# Patient Record
Sex: MALE
[De-identification: names, ages, dates, MRNs, and addresses within clinical notes are randomized per-mention and may not be internally consistent; named-entity substitution may affect disease eponyms.]

---

## 2020-07-21 PROBLEM — Z00.00 ENCOUNTER FOR PREVENTIVE HEALTH EXAMINATION: Status: ACTIVE | Noted: 2020-07-21

## 2020-07-23 ENCOUNTER — APPOINTMENT (OUTPATIENT)
Dept: OTOLARYNGOLOGY | Facility: CLINIC | Age: 80
End: 2020-07-23
Payer: MEDICARE

## 2020-07-23 VITALS
DIASTOLIC BLOOD PRESSURE: 74 MMHG | WEIGHT: 150 LBS | OXYGEN SATURATION: 98 % | HEART RATE: 49 BPM | SYSTOLIC BLOOD PRESSURE: 147 MMHG | BODY MASS INDEX: 29.45 KG/M2 | TEMPERATURE: 98.6 F | HEIGHT: 60 IN

## 2020-07-23 DIAGNOSIS — Z78.9 OTHER SPECIFIED HEALTH STATUS: ICD-10-CM

## 2020-07-23 DIAGNOSIS — Z87.09 PERSONAL HISTORY OF OTHER DISEASES OF THE RESPIRATORY SYSTEM: ICD-10-CM

## 2020-07-23 PROCEDURE — 69210 REMOVE IMPACTED EAR WAX UNI: CPT

## 2020-07-23 PROCEDURE — 92550 TYMPANOMETRY & REFLEX THRESH: CPT

## 2020-07-23 PROCEDURE — 99204 OFFICE O/P NEW MOD 45 MIN: CPT | Mod: 25

## 2020-07-23 PROCEDURE — 31575 DIAGNOSTIC LARYNGOSCOPY: CPT

## 2020-07-23 PROCEDURE — 92557 COMPREHENSIVE HEARING TEST: CPT

## 2020-07-23 RX ORDER — OMEPRAZOLE 40 MG/1
40 CAPSULE, DELAYED RELEASE ORAL
Qty: 90 | Refills: 1 | Status: ACTIVE | COMMUNITY
Start: 2020-07-23 | End: 1900-01-01

## 2020-07-23 NOTE — REVIEW OF SYSTEMS
[Patient Intake Form Reviewed] : Patient intake form was reviewed [As Noted in HPI] : as noted in HPI [Negative] : Neurological

## 2020-07-24 NOTE — HISTORY OF PRESENT ILLNESS
[de-identified] : 78 yo man with b progressive hl for years, uses hearing aids. He feels his hearing has decreased over the past week or two. In addition, he has had years of mucus in his mouth at night. After he lays down he feels it in his nose and throat and it makes him cough. it is a severe symptom;. Has tried atrovent and flonase from another doctor but it has not worked. no relevant fh nonsmoker.

## 2020-07-24 NOTE — REASON FOR VISIT
[Initial Evaluation] : an initial evaluation for [FreeTextEntry2] : hearing loss, mucus in throat making him cough at night

## 2020-07-24 NOTE — PROCEDURE
[Unable to Cooperate with Mirror] : patient unable to cooperate with mirror [Flexible Endoscope] : examined with the flexible endoscope [Complicated Symptoms] : complicated symptoms requiring more thorough examination than provided by mirror [Normal] : the false vocal folds were pink and regular, the ventricular sulcus was open, the true vocal folds were glistening white, tense and of equal length, mobility, and height [Interarytenoid Edema] : interarytenoid area edematous [Cerumen Impaction] : Cerumen Impaction [Same] : same as the Pre Op Dx. [] : Removal of Cerumen [Serial Number: ___] : Serial Number: [unfilled] [de-identified] : IAH cw rl\par done for cough fullness in throat and mucus unresponsive to allergy and atrophic rhinits therapy by other mds [FreeTextEntry6] : b copiou cerumen impaction removed atraumatically with suction, l under microscope

## 2020-07-24 NOTE — DATA REVIEWED
[de-identified] : b downsloping [facility] he says similar to his prior tests he did not bring. He did not bring his HA either.

## 2020-07-24 NOTE — DATA REVIEWED
[de-identified] : [facility] he says similar to his prior tests he did not bring. He did not bring his HA either.

## 2020-07-24 NOTE — PHYSICAL EXAM
[Midline] : trachea located in midline position [Laryngoscopy Performed] : laryngoscopy was performed, see procedure section for findings [Normal] : no rashes [de-identified] : b copious cerumen impaction removed atrauamticallly with sution

## 2020-07-24 NOTE — HISTORY OF PRESENT ILLNESS
[de-identified] : 80 yo man with b progressive hl for years, uses hearing aids. He feels his hearing has decreased over the past week or two. In addition, he has had years of mucus in his mouth at night. After he lays down he feels it in his nose and throat and it makes him cough. it is a severe symptom;. Has tried atrovent and flonase from another doctor but it has not worked. no relevant fh nonsmoker.

## 2020-07-24 NOTE — PROCEDURE
[Unable to Cooperate with Mirror] : patient unable to cooperate with mirror [Flexible Endoscope] : examined with the flexible endoscope [Complicated Symptoms] : complicated symptoms requiring more thorough examination than provided by mirror [Interarytenoid Edema] : interarytenoid area edematous [Normal] : the false vocal folds were pink and regular, the ventricular sulcus was open, the true vocal folds were glistening white, tense and of equal length, mobility, and height [Cerumen Impaction] : Cerumen Impaction [] : Removal of Cerumen [Same] : same as the Pre Op Dx. [Serial Number: ___] : Serial Number: [unfilled] [de-identified] : IAH cw rl\par done for cough fullness in throat and mucus unresponsive to allergy and atrophic rhinits therapy by other mds [FreeTextEntry6] : b copiou cerumen impaction removed atraumatically with suction, l under microscope

## 2020-07-24 NOTE — PHYSICAL EXAM
[Midline] : trachea located in midline position [Laryngoscopy Performed] : laryngoscopy was performed, see procedure section for findings [Normal] : no rashes [de-identified] : b copious cerumen impaction removed atrauamticallly with sution

## 2020-08-20 ENCOUNTER — APPOINTMENT (OUTPATIENT)
Dept: OTOLARYNGOLOGY | Facility: CLINIC | Age: 80
End: 2020-08-20
Payer: MEDICARE

## 2020-08-20 VITALS
SYSTOLIC BLOOD PRESSURE: 137 MMHG | HEART RATE: 61 BPM | OXYGEN SATURATION: 98 % | TEMPERATURE: 98.3 F | DIASTOLIC BLOOD PRESSURE: 73 MMHG

## 2020-08-20 PROCEDURE — 99214 OFFICE O/P EST MOD 30 MIN: CPT

## 2020-08-20 RX ORDER — OMEPRAZOLE 40 MG/1
40 CAPSULE, DELAYED RELEASE ORAL
Qty: 60 | Refills: 3 | Status: ACTIVE | COMMUNITY
Start: 2020-08-20 | End: 1900-01-01

## 2020-08-20 NOTE — ASSESSMENT
[FreeTextEntry1] : 79M who returns for follow up of RL, with improvement on exam, but continues to be symptomatic.\par \par Plan:\par - continue reflux diet modifications - he purchased Dropping Acid book by Dr Snider\par - Rx OMeprazole BID\par - f/u in 6 weeks

## 2020-08-20 NOTE — PROCEDURE
[Complicated Symptoms] : complicated symptoms requiring more thorough examination than provided by mirror [Unable to Cooperate with Mirror] : patient unable to cooperate with mirror [Flexible Endoscope] : examined with the flexible endoscope [Interarytenoid Edema] : interarytenoid area edematous [Serial Number: ___] : Serial Number: [unfilled] [de-identified] : for reflux followup Procedure was explained to patient with all risks, benefits and alternatives, all questions answered. Patient was positioned sitting upright with chest out. Bilateral nasal passages inspected with no erythema, edema, polyps, masses, lesions or purulent drainage. Scope was advanced via right nasal passage to posterior nasopharynx where no masses, lesions or drainage was noted. Scope was then advanced to oropharynx where no masses, lesions or obstruction was noted. Scope was then advanced to hypopharynx/larynx where no asymmetry, masses, lesions, pooled secretions, vocal cord dysmotility or stenosis was noted. Patient tolerated procedure well. Significantly improved interarytenoid and posterior cricoid edema and erythema. \par

## 2020-08-20 NOTE — HISTORY OF PRESENT ILLNESS
[de-identified] : 79M who returns for symptoms of reflux laryngitis. Patient reports his symptoms are improved, but still present. He reports continued cough and mostly phlegm in the throat. He denies any new ENT complaints. Overall it is mild at present.

## 2020-10-01 ENCOUNTER — APPOINTMENT (OUTPATIENT)
Dept: OTOLARYNGOLOGY | Facility: CLINIC | Age: 80
End: 2020-10-01
Payer: MEDICARE

## 2020-10-01 VITALS
HEART RATE: 56 BPM | DIASTOLIC BLOOD PRESSURE: 74 MMHG | TEMPERATURE: 97.9 F | SYSTOLIC BLOOD PRESSURE: 128 MMHG | OXYGEN SATURATION: 98 %

## 2020-10-01 DIAGNOSIS — R05 COUGH: ICD-10-CM

## 2020-10-01 PROCEDURE — 31575 DIAGNOSTIC LARYNGOSCOPY: CPT

## 2020-10-01 PROCEDURE — 99214 OFFICE O/P EST MOD 30 MIN: CPT | Mod: 25

## 2020-10-01 PROCEDURE — 69210 REMOVE IMPACTED EAR WAX UNI: CPT

## 2020-10-02 NOTE — PHYSICAL EXAM
[Midline] : trachea located in midline position [Laryngoscopy Performed] : laryngoscopy was performed, see procedure section for findings [Normal] : orientation to person, place, and time: normal [de-identified] : bilateral cerumen impaction, removed atraumatically with curet

## 2020-10-02 NOTE — ASSESSMENT
[FreeTextEntry1] : 79M who returns for follow up of LPR, found to have improved exam, but symptomatically still having "mucus". \par \par Plan:\par - f/u in 3 mo\par - continue Omeprazole BID\par - continue low acid diet\par - hydration and avoidance of throat insult by constant throat clearing and hacking\par - 45 minute visit  discussing what he has read in his reflux book at his request\par - cerumen removed\par - also advised to see gi

## 2020-10-02 NOTE — PROCEDURE
[Risk and Benefits Discussed] : The purpose, risks, discomforts, benefits and alternatives of the procedure have been explained to the patient including no treatment. [Cerumen Impaction] : Cerumen Impaction [Same] : same as the Pre Op Dx. [] : Removal of Cerumen [Unable to Cooperate with Mirror] : patient unable to cooperate with mirror [Complicated Symptoms] : complicated symptoms requiring more thorough examination than provided by mirror [Flexible Endoscope] : examined with the flexible endoscope [Serial Number: ___] : Serial Number: [unfilled] [Normal] : the false vocal folds were pink and regular, the ventricular sulcus was open, the true vocal folds were glistening white, tense and of equal length, mobility, and height [Interarytenoid Edema] : interarytenoid area edematous [de-identified] : Procedure was explained to patient with all risks, benefits and alternatives, all questions answered. Patient was positioned sitting upright with chest out. Bilateral nasal passages inspected with no erythema, edema, polyps, masses, lesions or purulent drainage. Scope was advanced via left nasal passage to posterior nasopharynx where no masses, lesions or drainage was noted. Scope was then advanced to oropharynx where no masses, lesions or obstruction was noted. Scope was then advanced to hypopharynx/larynx where no asymmetry, masses, lesions, pooled secretions, vocal cord dysmotility or stenosis was noted. Patient tolerated procedure well.\par Patient has elongated uvula and significantly improved interarytenoid and posterior cricoid edema and erythema. this was done because he is still bringing up matl- q 2 hrs [FreeTextEntry1] : see subjective

## 2020-10-02 NOTE — HISTORY OF PRESENT ILLNESS
[de-identified] : 79M who returns for globus sensation. He reports some continued globus sensation with needing to "spit up". He otherwise says his globus sensation is less and he has been following the Acid Watchers Diet book and taking his Omeprazole twice daily. He denies any new ENT symptoms. He reports he had to get out of bed to cough out "refluxed" material every 30 min samreen the past but now it is q 2 hrs when he uses the bathroom to urinate, for the past few weeks. Nonsmoker.

## 2021-01-07 ENCOUNTER — APPOINTMENT (OUTPATIENT)
Dept: OTOLARYNGOLOGY | Facility: CLINIC | Age: 81
End: 2021-01-07
Payer: MEDICARE

## 2021-01-07 VITALS
BODY MASS INDEX: 27.09 KG/M2 | OXYGEN SATURATION: 99 % | HEART RATE: 55 BPM | TEMPERATURE: 98 F | HEIGHT: 60 IN | SYSTOLIC BLOOD PRESSURE: 138 MMHG | WEIGHT: 138 LBS | DIASTOLIC BLOOD PRESSURE: 67 MMHG

## 2021-01-07 DIAGNOSIS — H90.3 SENSORINEURAL HEARING LOSS, BILATERAL: ICD-10-CM

## 2021-01-07 DIAGNOSIS — H61.23 IMPACTED CERUMEN, BILATERAL: ICD-10-CM

## 2021-01-07 PROCEDURE — 99214 OFFICE O/P EST MOD 30 MIN: CPT | Mod: 25

## 2021-01-07 PROCEDURE — 31575 DIAGNOSTIC LARYNGOSCOPY: CPT

## 2021-01-07 PROCEDURE — 69210 REMOVE IMPACTED EAR WAX UNI: CPT

## 2021-01-07 RX ORDER — OMEPRAZOLE 40 MG/1
40 CAPSULE, DELAYED RELEASE ORAL TWICE DAILY
Qty: 60 | Refills: 3 | Status: ACTIVE | COMMUNITY
Start: 2021-01-07 | End: 1900-01-01

## 2021-01-07 NOTE — PROCEDURE
[Unable to Cooperate with Mirror] : patient unable to cooperate with mirror [Globus] : globus [Flexible Endoscope] : examined with the flexible endoscope [Normal] : the false vocal folds were pink and regular, the ventricular sulcus was open, the true vocal folds were glistening white, tense and of equal length, mobility, and height [Interarytenoid Edema] : interarytenoid area edematous [Cerumen Impaction] : Cerumen Impaction [Same] : same as the Pre Op Dx. [] : Removal of Cerumen [Serial Number: ___] : Serial Number: [unfilled] [de-identified] : done for globus sensation\par iah but improved [FreeTextEntry6] : b copious cerumen impaction removed atraumatically with suction

## 2021-01-07 NOTE — PHYSICAL EXAM
[Normal] : no rashes [de-identified] : b copious cerumen impactions removed atraumatically with suction

## 2021-01-07 NOTE — HISTORY OF PRESENT ILLNESS
[de-identified] : followup 79 yo man with h/o b snhl for which he wears hearing aids and reflux laryngitis mainly with a globus sensation. On dietary and mechanical precautions and omep bid, he notes that his hs are improved but not resolved. He had some dietary questions I answered like wither it is ok to have soup with a pinch of onion. Nonsmoker. Overall sx are now milder.

## 2021-01-07 NOTE — REASON FOR VISIT
[Subsequent Evaluation] : a subsequent evaluation for [FreeTextEntry2] : followup reflux laryngitis and hl

## 2021-01-07 NOTE — ASSESSMENT
[FreeTextEntry1] : reflux laryngitis- well controlled but still present on diet mech omep 40 bid\par omep renewed and I recommended he continue as is\par cerumen removed - hearing with aids improved\par he reports his amplification is adequate - leave HA as is\par rtc 3 mo

## 2021-01-13 RX ORDER — OMEPRAZOLE 40 MG/1
40 CAPSULE, DELAYED RELEASE ORAL
Qty: 180 | Refills: 1 | Status: ACTIVE | COMMUNITY
Start: 2021-01-13 | End: 1900-01-01

## 2021-04-22 ENCOUNTER — APPOINTMENT (OUTPATIENT)
Dept: OTOLARYNGOLOGY | Facility: CLINIC | Age: 81
End: 2021-04-22
Payer: MEDICARE

## 2021-04-22 VITALS
TEMPERATURE: 98.3 F | HEART RATE: 52 BPM | SYSTOLIC BLOOD PRESSURE: 166 MMHG | OXYGEN SATURATION: 99 % | DIASTOLIC BLOOD PRESSURE: 76 MMHG

## 2021-04-22 DIAGNOSIS — K21.9 ACUTE LARYNGITIS: ICD-10-CM

## 2021-04-22 DIAGNOSIS — J04.0 ACUTE LARYNGITIS: ICD-10-CM

## 2021-04-22 DIAGNOSIS — R09.89 OTHER SPECIFIED SYMPTOMS AND SIGNS INVOLVING THE CIRCULATORY AND RESPIRATORY SYSTEMS: ICD-10-CM

## 2021-04-22 PROCEDURE — 99213 OFFICE O/P EST LOW 20 MIN: CPT | Mod: 25

## 2021-04-22 PROCEDURE — 31575 DIAGNOSTIC LARYNGOSCOPY: CPT

## 2021-04-22 NOTE — PROCEDURE
[Flexible Endoscope] : examined with the flexible endoscope [Serial Number: ___] : Serial Number: [unfilled] [Normal] : posterior cricoid area had healthy pink mucosa in the interarytenoid area and the esophageal inlet [de-identified] : Procedure was explained to patient with all risks, benefits and alternatives, all questions answered. Patient was positioned sitting upright with chest out. Bilateral nasal passages inspected with no erythema, edema, polyps, masses, lesions or purulent drainage. Scope was advanced via right nasal passage to posterior nasopharynx where no masses, lesions or drainage was noted. Scope was then advanced to oropharynx where no masses, lesions or obstruction was noted. Scope was then advanced to hypopharynx/larynx where no asymmetry, masses, lesions, pooled secretions, vocal cord dysmotility or stenosis was noted. Improved interarytenoid and posterior cricoid edema. Patient tolerated procedure well.\par

## 2021-04-22 NOTE — HISTORY OF PRESENT ILLNESS
[de-identified] : 80M who returns for follow up of reflux laryngitis. Patient reports he still occasionally gets phlegm in the throat but has stopped taking the omeprazole. He denies any sore throat, dysphagia, hoarseness. No other ENT complaints. Symptoms are mild. He wishes to remain off of meds. He is following reflux diet per Dr Pranay rBaun's book.

## 2021-04-22 NOTE — PHYSICAL EXAM
[Normal] : mucosa is normal [Midline] : trachea located in midline position [Laryngoscopy Performed] : laryngoscopy was performed, see procedure section for findings

## 2021-04-22 NOTE — ASSESSMENT
[FreeTextEntry1] : 80M who returns for reflux laryngitis follow up with improved/resolved reflux laryngitis on exam.\par \par Plan:\par \par - can trial off reflux medications and restart if symptoms return\par - urged to continue dietary and mechanical precuations\par - rtc to recheck 3 mo

## 2021-07-06 ENCOUNTER — APPOINTMENT (OUTPATIENT)
Dept: OTOLARYNGOLOGY | Facility: CLINIC | Age: 81
End: 2021-07-06

## 2022-04-11 PROBLEM — J04.0 REFLUX LARYNGITIS: Status: ACTIVE | Noted: 2020-07-23
